# Patient Record
Sex: FEMALE | Race: WHITE
[De-identification: names, ages, dates, MRNs, and addresses within clinical notes are randomized per-mention and may not be internally consistent; named-entity substitution may affect disease eponyms.]

---

## 2019-11-22 ENCOUNTER — HOSPITAL ENCOUNTER (EMERGENCY)
Dept: HOSPITAL 96 - M.ERS | Age: 19
Discharge: HOME | End: 2019-11-22
Payer: COMMERCIAL

## 2019-11-22 VITALS — BODY MASS INDEX: 20.16 KG/M2 | WEIGHT: 100 LBS | HEIGHT: 59 IN

## 2019-11-22 VITALS — SYSTOLIC BLOOD PRESSURE: 109 MMHG | DIASTOLIC BLOOD PRESSURE: 67 MMHG

## 2019-11-22 DIAGNOSIS — R55: Primary | ICD-10-CM

## 2019-11-22 LAB
ABSOLUTE BASOPHILS: 0 THOU/UL (ref 0–0.2)
ABSOLUTE EOSINOPHILS: 0 THOU/UL (ref 0–0.7)
ABSOLUTE MONOCYTES: 0.5 THOU/UL (ref 0–1.2)
ALBUMIN SERPL-MCNC: 4 G/DL (ref 3.4–5)
ALP SERPL-CCNC: 55 U/L (ref 46–116)
ALT SERPL-CCNC: 25 U/L (ref 30–65)
ANION GAP SERPL CALC-SCNC: 7 MMOL/L (ref 7–16)
AST SERPL-CCNC: 18 U/L (ref 15–37)
BASOPHILS NFR BLD AUTO: 0.5 %
BILIRUB SERPL-MCNC: 0.3 MG/DL
BUN SERPL-MCNC: 14 MG/DL (ref 7–18)
CALCIUM SERPL-MCNC: 9.1 MG/DL (ref 8.5–10.1)
CHLORIDE SERPL-SCNC: 104 MMOL/L (ref 98–107)
CO2 SERPL-SCNC: 29 MMOL/L (ref 21–32)
CREAT SERPL-MCNC: 0.8 MG/DL (ref 0.6–1.3)
EOSINOPHIL NFR BLD: 0.6 %
GLUCOSE SERPL-MCNC: 109 MG/DL (ref 70–99)
GRANULOCYTES NFR BLD MANUAL: 65.2 %
HCT VFR BLD CALC: 37.8 % (ref 37–47)
HGB BLD-MCNC: 12.9 GM/DL (ref 12–15)
LIPASE: 89 U/L (ref 73–393)
LYMPHOCYTES # BLD: 1.1 THOU/UL (ref 0.8–5.3)
LYMPHOCYTES NFR BLD AUTO: 22.6 %
MCH RBC QN AUTO: 29.6 PG (ref 26–34)
MCHC RBC AUTO-ENTMCNC: 34.2 G/DL (ref 28–37)
MCV RBC: 86.6 FL (ref 80–100)
MONOCYTES NFR BLD: 11.1 %
MPV: 9 FL. (ref 7.2–11.1)
NEUTROPHILS # BLD: 3.1 THOU/UL (ref 1.6–8.1)
NUCLEATED RBCS: 0 /100WBC
PLATELET COUNT*: 195 THOU/UL (ref 150–400)
POTASSIUM SERPL-SCNC: 3.3 MMOL/L (ref 3.5–5.1)
PROT SERPL-MCNC: 7.9 G/DL (ref 6.4–8.2)
RBC # BLD AUTO: 4.36 MIL/UL (ref 4.2–5)
RDW-CV: 13.3 % (ref 10.5–14.5)
SODIUM SERPL-SCNC: 140 MMOL/L (ref 136–145)
WBC # BLD AUTO: 4.7 THOU/UL (ref 4–11)

## 2019-11-22 NOTE — EKG
[place] [address]
Phone:  [phone]                     ELECTROCARDIOGRAM REPORT      
_______________________________________________________________________________
 
Name:       BRIELLE CAMARA               Room:                      REG ER  
M.R.#:  [id number]      Account #:      [id number]  
Admission:  19     Attend Phys:                         
Discharge:               Date of Birth:  [birth year]  
         Report #: [id number]
    [id number]
_______________________________________________________________________________
THIS REPORT FOR:  //name//                      
 
                         [facility] ED
                                       
Test Date:    2019               Test Time:    13:30:06
Pat Name:     BRIELLE CAMARA           Department:   
Patient ID:   [id number]            Room:          
Gender:       F                        Technician:   ДМИТРИЙ
:          2000               Requested By: Gil Mcclelland
Order Number: [id number] MD:   Cal David
                                 Measurements
Intervals                              Axis          
Rate:         72                       P:            57
DC:           196                      QRS:          67
QRSD:         90                       T:            34
QT:           380                                    
QTc:          416                                    
                           Interpretive Statements
Sinus arrhythmia
No previous ECG available for comparison
 
Electronically Signed On 2019 15:43:12 CST by Cal David
[email or web address]
 
 
 
 
 
 
 
 
 
 
 
 
 
 
 
 
 
 
 
 
  <ELECTRONICALLY SIGNED>
                                           By: Cal David MD, [facility]     
  19     1543
D: 19 1330   _____________________________________
T: 19 1330   Cal David MD, FACC       /EPI

## 2020-01-23 ENCOUNTER — HOSPITAL ENCOUNTER (OUTPATIENT)
Dept: HOSPITAL 96 - M.CL | Age: 20
Discharge: HOME | End: 2020-01-23
Attending: INTERNAL MEDICINE
Payer: COMMERCIAL

## 2020-01-23 VITALS — DIASTOLIC BLOOD PRESSURE: 85 MMHG | SYSTOLIC BLOOD PRESSURE: 120 MMHG

## 2020-01-23 VITALS — SYSTOLIC BLOOD PRESSURE: 98 MMHG | DIASTOLIC BLOOD PRESSURE: 41 MMHG

## 2020-01-23 VITALS — SYSTOLIC BLOOD PRESSURE: 111 MMHG | DIASTOLIC BLOOD PRESSURE: 70 MMHG

## 2020-01-23 VITALS — SYSTOLIC BLOOD PRESSURE: 120 MMHG | DIASTOLIC BLOOD PRESSURE: 85 MMHG

## 2020-01-23 VITALS — SYSTOLIC BLOOD PRESSURE: 132 MMHG | DIASTOLIC BLOOD PRESSURE: 72 MMHG

## 2020-01-23 VITALS — DIASTOLIC BLOOD PRESSURE: 86 MMHG | SYSTOLIC BLOOD PRESSURE: 125 MMHG

## 2020-01-23 VITALS — DIASTOLIC BLOOD PRESSURE: 69 MMHG | SYSTOLIC BLOOD PRESSURE: 121 MMHG

## 2020-01-23 VITALS — SYSTOLIC BLOOD PRESSURE: 126 MMHG | DIASTOLIC BLOOD PRESSURE: 82 MMHG

## 2020-01-23 VITALS — DIASTOLIC BLOOD PRESSURE: 77 MMHG | SYSTOLIC BLOOD PRESSURE: 120 MMHG

## 2020-01-23 VITALS — DIASTOLIC BLOOD PRESSURE: 84 MMHG | SYSTOLIC BLOOD PRESSURE: 122 MMHG

## 2020-01-23 VITALS — SYSTOLIC BLOOD PRESSURE: 111 MMHG | DIASTOLIC BLOOD PRESSURE: 78 MMHG

## 2020-01-23 VITALS — SYSTOLIC BLOOD PRESSURE: 116 MMHG | DIASTOLIC BLOOD PRESSURE: 82 MMHG

## 2020-01-23 DIAGNOSIS — Z79.899: ICD-10-CM

## 2020-01-23 DIAGNOSIS — Z98.890: ICD-10-CM

## 2020-01-23 DIAGNOSIS — R55: Primary | ICD-10-CM

## 2020-01-25 NOTE — CARD
[address]  [address]                    CARDIAC CATH REPORT           
_______________________________________________________________________________
 
Name:       JAXBRIELLE               Room:                      REG CLI 
M.R.#:  [id number]      Account #:      [id number]  
Admission:  01/23/20     Attend Phys:    Moisés Soler MD
Discharge:               Date of Birth:  [birth year]  
         Report #: [id number]
                                                                     [id number]  
_______________________________________________________________________________
THIS REPORT FOR:  //name//                      
 
CC: Layo Sharma MD [facility] physician/PCP
    Moisés Soler
 
PROCEDURE:  Tilt-table test.
 
INDICATION:  Recurrent syncope.
 
DESCRIPTION OF PROCEDURE:  After informed consent was obtained, the patient was
brought to the cardiac holding area.  After obtaining initial vital signs, the
patient was placed on the tilt table.  She was secured using the designated
bands and tilted to the head upright position of 70 degrees.  Blood pressure and
heart rate were monitored every 2 minutes for 20 minutes.  Following the initial
20-minute session, the patient was given a single sublingual nitroglycerin. 
Again, blood pressure and heart rate were monitored every 2 minutes. 
Approximately 5 minutes after being given the nitroglycerin, the patient noted
symptoms of lightheadedness and dizziness.  The patient subsequently had sinus
node arrest with a 14-second pause and syncope.  The tilt table was placed in
the supine position and the patient recovered unremarkably.
 
The patient's blood pressure on arrival was 111/70 mmHg with a heart rate of 100
beats per minute.  Orthostatics were checked.  The patient was not orthostatic. 
The initial blood pressure after being placed on the tilt table was 132/72 mmHg
with a pulse rate of 115 beats per minute.  The patient's heart rate and blood
pressure remained stable throughout the initial phase of the tilt table.  At the
completion of the initial phase, the patient's blood pressure was 126/82 mmHg
with a heart rate of 114 beats per minute.  At this point, the patient was given
a sublingual nitroglycerin.  Five minutes later, the patient stated she felt
like she was going to pass out.  The patient's blood pressure was noted to be
98/41 mmHg.  The heart rate initially decreased to approximately 98 beats per
minute followed by abrupt bradycardia and sinus node arrest with a 14-second
pause.  After being placed in the supine position, the patient's heart rate
picked up to 76 beats per minute and her blood pressure gradually recovered.
 
IMPRESSION:
1.  Recurrent syncope.
2.  Positive tilt table test showing a cardioinhibitory response to
vasodilation.
 
 
 
 
<ELECTRONICALLY SIGNED>
                                        By:  Moisés Soler MD, FACC   
01/25/20     1152
D: 01/23/20 1614_______________________________________
T: 01/24/20 0119Moisés Soler MD, FACC      /nt